# Patient Record
Sex: FEMALE | Race: WHITE | HISPANIC OR LATINO
[De-identification: names, ages, dates, MRNs, and addresses within clinical notes are randomized per-mention and may not be internally consistent; named-entity substitution may affect disease eponyms.]

---

## 2020-04-27 ENCOUNTER — TRANSCRIPTION ENCOUNTER (OUTPATIENT)
Age: 60
End: 2020-04-27

## 2021-11-16 ENCOUNTER — TRANSCRIPTION ENCOUNTER (OUTPATIENT)
Age: 61
End: 2021-11-16

## 2022-03-08 PROBLEM — Z00.00 ENCOUNTER FOR PREVENTIVE HEALTH EXAMINATION: Status: ACTIVE | Noted: 2022-03-08

## 2022-04-06 ENCOUNTER — APPOINTMENT (OUTPATIENT)
Dept: ORTHOPEDIC SURGERY | Facility: CLINIC | Age: 62
End: 2022-04-06
Payer: COMMERCIAL

## 2022-04-06 VITALS — BODY MASS INDEX: 37.69 KG/M2 | WEIGHT: 192 LBS | HEIGHT: 60 IN

## 2022-04-06 VITALS — TEMPERATURE: 98 F

## 2022-04-06 PROCEDURE — 99204 OFFICE O/P NEW MOD 45 MIN: CPT

## 2022-04-06 PROCEDURE — 73564 X-RAY EXAM KNEE 4 OR MORE: CPT | Mod: 50

## 2022-04-07 RX ORDER — DICLOFENAC SODIUM 50 MG/1
50 TABLET, DELAYED RELEASE ORAL
Qty: 60 | Refills: 1 | Status: ACTIVE | COMMUNITY
Start: 2022-04-07 | End: 1900-01-01

## 2022-04-07 NOTE — DISCUSSION/SUMMARY
[de-identified] : bl knee OA\par \par The patient and I discussed the causes and progression of degenerative joint disease of the knee. Models, diagrams and drawings were used in the discussion. Treatment can include conservative non-operative management and surgical options. Conservative management includes weight loss, activity modification, physical therapy to improve motion and strength in the muscles around the knee and the body's core, PO and topical NSAIDs, corticosteroid and/or viscosupplementation intra-articular injections. If the patient fails to improve with non-operative management, surgical management is possible. Depending upon the patient's age, BMI, activity level, degree and location of arthrosis different surgical options are possible including arthroscopic debridement with chondroplasty, high-tibial osteotomy, unicondylar/partial arthroplasty, and total joint arthroplasty.\par \par Physical therapy was prescribed for knee ROM exercises, strengthening exercises, deep tissue massage, core strengthening, hip abductor strengthening, VMO strengthening, modalities PRN, and home exercises\par \par The patient was prescribed Diclofenac PO non-steroidal anti-inflammatory medication. 50mg tablets twice daily to be taken for at least 1-2 weeks in a row and then PRN afterwards. Risks and benefits were discussed and include but not limited to renal damage and GI ulceration and bleeding.  They were advised to take with food to limit stomach upset as well as warned to stop the medication if worsening gastric pain or dizziness or other side effects. Also to immediately stop the medication and seek appropriate medical attention if any severe stomach ache, gastritis, black/red vomit, black/red stools or any other medical concern.\par \par Patient declined corticosteroid injection\par \par The patient verifies their understanding the the visit, diagnosis and plan. They agree with the treatment plan and will contact the office with any questions or problems.\par Follow up\par PRN

## 2022-04-07 NOTE — PHYSICAL EXAM
[de-identified] : Physical Examination General: well nourished, in no acute distress, alert and oriented to person, place and time Psychiatric: normal mood and affect, no abnormal movements or speech patterns Eyes: vision intact WITHOUT glasses  Musculoskeletal Examination Ambulation	NO antalgic gait, NO assistive devices  \par Knee			Right			Left \par General     \par Swelling/Deformity	normal			normal	      \par Skin			normal			normal      \par Erythema		-			-      \par Standing Alignment	varus			varus      \par Effusion		none			none\par  Range of Motion      \par Hip			full painless ROM		full painless ROM      \par Knee Flexion		100			100      \par Knee Extension	0			0 \par Patella      \par J Sign		-			-     \par  Quad Medial/Lateral	1/1 1/1      \par Apprehension		-			-      \par Alexis's		-			-      \par Grind Sign		-			-      \par Crepitus		-			- \par Palpation      \par Medial Joint Line	-			-      \par Medial Fem Condyle	-			-      \par Lateral Joint Line	-			-      \par Quad Tendon		-			-      \par Patella Tendon	-			-      \par Medial Patella		-			-      \par Lateral Patella 	-			-      \par Posterior Knee	-			-\par \par  Ligamentous      \par Varus @ 0° / 30°	-/-			-/-      \par Valgus @ 0° / 30°	-/-			-/-      \par Meniscus      \par Nikko		-			-      \par Flexion Pinch		-			- \par Strength Examination/Atrophy      \par Hip Flexors 		5+			5+      \par Quadriceps		5+			5+      \par Hamstring		5+			5+      \par Tibialis Anterior	5+			5+      \par Achilles/Soleus	5+			5+\par \par  Sensation      Deep Peroneal	normal			normal      Superficial Peroneal 	normal			normal      Sural  		normal			normal      Posterior Tibial 	normal			normal      Saphneous 		normal			normal Pulses      DP			2+			2+  [de-identified] : 4 views of the affected [BL] knee (standing AP, flexing standing AP, 30degree flexed lateral, sunrise view)\par were ordered, obtained and evaluated by myself today and\par demonstrate:\par \par RIGHT\par severe medial narrowing\par small osteophytic lipping\par trace suprapatellar effusion\par midl patellofemoral joint space loss without evidence of tilt or subluxation on sunrise view\par Normal soft tissue density\par Otherwise normal osseous bone structure without fracture or dislocation\par \par LEFT\par severe medial narrowing\par small osteophytic lipping\par trace suprapatellar effusion\par midl patellofemoral joint space loss without evidence of tilt or subluxation on sunrise view\par Normal soft tissue density\par Otherwise normal osseous bone structure without fracture or dislocation\par

## 2022-04-18 ENCOUNTER — NON-APPOINTMENT (OUTPATIENT)
Age: 62
End: 2022-04-18

## 2023-03-08 ENCOUNTER — APPOINTMENT (OUTPATIENT)
Dept: ORTHOPEDIC SURGERY | Facility: CLINIC | Age: 63
End: 2023-03-08
Payer: COMMERCIAL

## 2023-03-08 PROCEDURE — 99204 OFFICE O/P NEW MOD 45 MIN: CPT

## 2023-03-08 PROCEDURE — 73564 X-RAY EXAM KNEE 4 OR MORE: CPT | Mod: 50

## 2023-03-08 RX ORDER — HYALURONATE SODIUM 20 MG/2 ML
20 SYRINGE (ML) INTRAARTICULAR
Qty: 6 | Refills: 0 | Status: ACTIVE | COMMUNITY
Start: 2023-03-08

## 2023-03-09 RX ORDER — HYALURONATE SODIUM 20 MG/2 ML
20 SYRINGE (ML) INTRAARTICULAR
Qty: 6 | Refills: 0 | Status: ACTIVE | COMMUNITY
Start: 2023-03-09 | End: 1900-01-01

## 2023-03-10 DIAGNOSIS — M17.0 BILATERAL PRIMARY OSTEOARTHRITIS OF KNEE: ICD-10-CM

## 2023-03-10 RX ORDER — DICLOFENAC SODIUM 75 MG/1
75 TABLET, DELAYED RELEASE ORAL TWICE DAILY
Qty: 30 | Refills: 0 | Status: ACTIVE | COMMUNITY
Start: 2023-03-10 | End: 1900-01-01

## 2023-03-17 NOTE — DISCUSSION/SUMMARY
[de-identified] : The natural progression of osteoarthritis was explained to the patient.  We discussed the possible treatment options from conservative to operative.  These included NSAIDS, Glucosamine and Chondrotin sulfate, and Physical Therapy as well different types of injections.  We also discussed that at some point they may progress to needed a TKA.  Information and pamphlets were given.\par \par We discussed their diagnosis and treatment options at length including surgical and non-surgical options. We will first attempt conservative treatment with activity modification, PT, icing, weight loss, and anti-inflammatory medications. We discussed the possible of injections (steroid and viscosupplementation) in the future. The patient was provided with a PT prescription to work on ROM, hip ER/abductors strengthening, quad/hamstring stretches and strengthening, and other exercises on the Knee Arthritis Protocol.\par \par Dx / Natural History:\par The patient was advised of the diagnosis.  The natural history of the pathology was explained in full to the patient in layman's terms.  Several different treatment options were discussed and explained in full to the patient including the risks and benefits of both surgical and non-surgical treatments.  All questions and concerns were answered. \par \par Pain Guide Activities:\par The patient was advised to let pain guide the gradual advancement of activities.\par \par Physical Therapy:\par The patient was provided with a prescription for Physical Therapy.\par \par Icing:\par The patient was advised to apply ice (wrapped in a towel or protective covering) to the area daily (20 minutes at a time, 2-4X/day).\par \par Follow up in 4-6 weeks to re-evaluate.

## 2023-03-17 NOTE — PHYSICAL EXAM
[de-identified] : The patient is a well appearing 62 year old female of their stated age.\par Patient ambulates with a ANTALGIC gait.\par Negative straight leg raise bilateral\par \par General: in no acute distress, seated comfortably, moving easily\par Skin: No discoloration, rashes; on palpation skin is dry, \par Neuro: Normal sensation all dermatomes, motor all myotomes\par Vascular: Normal pulses, no edema, normal temperature\par Coordination and balance: Normal\par Psych: normal mood and affect, non pressured speech, alert and oriented x3\par \par Right Knee:                         	\par ROM:  0-145 degrees\par \par Lachman: Negative\par Pivot Shift: Negative\par Anterior Drawer: Negative\par Posterior Drawer / Sag: Negative\par Varus Stress 0 degrees: Stable\par Varus Stress 30 degrees: Stable\par Valgus Stress 0 degrees: Stable\par Valgus Stress 30 degrees: Stable\par Medial Rogelio: Positive\par Lateral Rogelio: Negative\par Patella Glide: 2+\par Patella Apprehension: Negative\par Patella Grind: Positive\par \par Palpation:\par Medial Joint Line: TTP\par Lateral Joint Line: TTP\par Medial Collateral Ligament: Nontender\par Lateral Collateral Ligament/PLC: Nontender\par Distal Femur: Nontender\par Proximal Tibia: Nontender\par Tibial Tubercle: Nontender\par Distal Pole Patella: Nontender\par Quadriceps Tendon: Nontender &  Intact\par Patella Tendon: Nontender &  Intact\par Medial Distal Hamstring/PES: Nontender\par Lateral Distal Hamstring: Nontender & Stable\par Iliotibial Band: Nontender\par Medial Patellofemoral Ligament: Nontender\par Adductor: Nontender\par Proximal GSC-Plantaris: Nontender\par Calf: Supple & Nontender\par \par Inspection:\par Deformity: No\par Erythema: No\par Ecchymosis: No\par Abrasions: No\par Effusion: Moderate\par Prepatellar Bursitis: No\par \par Neurologic Exam:\par Sensation L4-S1: Grossly Intact\par \par Motor Exam:\par Quadriceps: 5 out of 5\par Hamstrings: 5 out of 5\par EHL: 5 out of 5\par FHL: 5 out of 5\par TA: 5 out of 5\par GS: 5 out of 5\par \par Circulatory/Pulses:\par Dorsalis Pedis: 2+\par Posterior Tibialis: 2+\par \par Additional Pertinent Findings: None\par \par Left Knee:                           	\par ROM:  0-145 degrees\par \par Lachman: Negative\par Pivot Shift: Negative\par Anterior Drawer: Negative\par Posterior Drawer / Sag: Negative\par Varus Stress 0 degrees: Stable\par Varus Stress 30 degrees: Stable\par Valgus Stress 0 degrees: Stable\par Valgus Stress 30 degrees: Stable\par Medial Rogelio: Positive\par Lateral Rogelio: Negative\par Patella Glide: 2+\par Patella Apprehension: Negative\par Patella Grind: Positive\par \par Palpation:\par Medial Joint Line: TTP\par Lateral Joint Line: TTP\par Medial Collateral Ligament: Nontender\par Lateral Collateral Ligament/PLC: Nontender\par Distal Femur: Nontender\par Proximal Tibia: Nontender\par Tibial Tubercle: Nontender\par Distal Pole Patella: Nontender\par Quadriceps Tendon: Nontender &  Intact\par Patella Tendon: Nontender &  Intact\par Medial Distal Hamstring/PES: Nontender\par Lateral Distal Hamstring: Nontender & Stable\par Iliotibial Band: Nontender\par Medial Patellofemoral Ligament: Nontender\par Adductor: Nontender\par Proximal GSC-Plantaris: Nontender\par Calf: Supple & Nontender\par \par Inspection:\par Deformity: No\par Erythema: No\par Ecchymosis: No\par Abrasions: No\par Effusion: Moderate\par Prepatellar Bursitis: No\par \par Neurologic Exam:\par Sensation L4-S1: Grossly Intact\par \par Motor Exam:\par Quadriceps: 5 out of 5\par Hamstrings: 5 out of 5\par EHL: 5 out of 5\par FHL: 5 out of 5\par TA: 5 out of 5\par GS: 5 out of 5\par \par Circulatory/Pulses:\par Dorsalis Pedis: 2+\par Posterior Tibialis: 2+  [Bilateral] : knee bilaterally [There are no fractures, subluxations or dislocations. No significant abnormalities are seen] : There are no fractures, subluxations or dislocations. No significant abnormalities are seen [Moderate tricompartmental OA medial narrowing] : Moderate tricompartmental OA medial narrowing

## 2023-03-17 NOTE — HISTORY OF PRESENT ILLNESS
[7] : 7 [5] : 5 [Dull/Aching] : dull/aching [Intermittent] : intermittent [Sleep] : sleep [Sitting] : sitting [] : no [FreeTextEntry5] : RAJENDRA 62 year old F here for Bilateral knee, onset pain for a few years, last saw  , pt was dx with OA \par  [de-identified] :   [de-identified] : x -ray

## 2024-02-26 ENCOUNTER — APPOINTMENT (OUTPATIENT)
Dept: NEUROLOGY | Facility: CLINIC | Age: 64
End: 2024-02-26

## 2024-04-29 ENCOUNTER — APPOINTMENT (OUTPATIENT)
Dept: ORTHOPEDIC SURGERY | Facility: CLINIC | Age: 64
End: 2024-04-29
Payer: COMMERCIAL

## 2024-04-29 VITALS — HEIGHT: 60 IN | WEIGHT: 189 LBS | BODY MASS INDEX: 37.11 KG/M2

## 2024-04-29 DIAGNOSIS — M17.11 UNILATERAL PRIMARY OSTEOARTHRITIS, RIGHT KNEE: ICD-10-CM

## 2024-04-29 DIAGNOSIS — M17.12 UNILATERAL PRIMARY OSTEOARTHRITIS, LEFT KNEE: ICD-10-CM

## 2024-04-29 PROCEDURE — 73564 X-RAY EXAM KNEE 4 OR MORE: CPT | Mod: 50

## 2024-04-29 PROCEDURE — 99214 OFFICE O/P EST MOD 30 MIN: CPT | Mod: 25

## 2024-04-29 PROCEDURE — 20610 DRAIN/INJ JOINT/BURSA W/O US: CPT | Mod: 50

## 2024-04-29 RX ORDER — OMEPRAZOLE 40 MG/1
40 CAPSULE, DELAYED RELEASE ORAL
Refills: 0 | Status: ACTIVE | COMMUNITY

## 2024-04-29 RX ORDER — ATORVASTATIN CALCIUM 40 MG/1
40 TABLET, FILM COATED ORAL
Refills: 0 | Status: ACTIVE | COMMUNITY

## 2024-04-29 RX ORDER — FERROUS SULFATE 325(65) MG
324 TABLET ORAL
Refills: 0 | Status: ACTIVE | COMMUNITY

## 2024-04-29 RX ORDER — FLUTICASONE PROPIONATE 220 UG/1
AEROSOL, METERED RESPIRATORY (INHALATION)
Refills: 0 | Status: ACTIVE | COMMUNITY

## 2024-04-29 NOTE — PROCEDURE
[FreeTextEntry3] : The risks, benefits and alternatives to the injection were discussed with the patient. The decision was made to proceed with the injection to reduce inflammation within the area. Verbal consent was obtained for the procedure. The bilateral knee was cleaned with alcohol and ethyl chloride was sprayed topically. 1cc of 40mg Kenalog and 4cc of 1% lidocaine was injected. The patient tolerated the procedure well and was instructed to ice the affected joint as needed later today. Activities can be performed as tolerated

## 2024-04-29 NOTE — HISTORY OF PRESENT ILLNESS
[7] : 7 [4] : 4 [Dull/Aching] : dull/aching [Localized] : localized [Constant] : constant [Meds] : meds [Sitting] : sitting [Standing] : standing [Walking] : walking [Bending forward] : bending forward [Stairs] : stairs [Lying in bed] : lying in bed [de-identified] : 04/29/2024: Ms. RAJENDRA RAMIREZ is a 63 year female that comes in today with a chief complaint of bilateral knee pain. Pt denies any prior injury and states that her knee pain has worsened over time. She has seen Dr. Sweeney in the past but she didn't receive any injections and didn't go to PT. Pt has been taking Tylenol and Diclofenac as needed for relief. She states that she feels pain with any activity and at rest.  [] : no [FreeTextEntry1] : bilateral knee  [FreeTextEntry6] : stiffness

## 2024-04-29 NOTE — ASSESSMENT
[FreeTextEntry1] : 63 year F with bilateral knee OA, no prior injections CSI of bilateral knees. 3 months follow up PRN

## 2024-04-29 NOTE — IMAGING
[de-identified] : Constitutional: well developed and well nourished, able to communicate Cardiovascular: Peripheral vascular exam is grossly normal Neurologic: Alert and oriented, no acute distress. Skin: normal skin with no ulcers, rashes, or lesions Pulmonary: No respiratory distress, breathing comfortably on room air Lymphatics: No obvious lymphadenopathy or lymphedema in areas examined  RIGHT KNEE EXAM Alignment: varus Effusion: None Atrophy: None                                                  Stable to Varus/valgus stress Posterior Drawer Test: negative Anterior Drawer Test: Negative Knee Extension/Flexion: 0 / 120  Medial/lateral compartments Medial joint line: POS Tenderness Lateral joint line: No Tenderness Rogelio test: negative  Patellofemoral joint Medial patellar facet: no tenderness Patellar grind: Negative  Tendons: Pes Anserine: No tenderness Gerdys Tubercle/ IT Band: No tenderness Quadriceps Tendon: No Tenderness patellar tendon: no Tenderness Tibial tubercle: not tenderness Calf: no Tenderness  Neurovascular exam Muscle strength: 5/5 Sensation to light touch: intact Distal pulses: 2+  [id number] Xrays of the Right Knee were taken demonstrating tricompartmental arthritis with joint space collapse, osteophytes, and sclerosis

## 2024-07-29 ENCOUNTER — APPOINTMENT (OUTPATIENT)
Dept: ORTHOPEDIC SURGERY | Facility: CLINIC | Age: 64
End: 2024-07-29
Payer: COMMERCIAL

## 2024-07-29 DIAGNOSIS — M17.12 UNILATERAL PRIMARY OSTEOARTHRITIS, LEFT KNEE: ICD-10-CM

## 2024-07-29 DIAGNOSIS — M75.22 BICIPITAL TENDINITIS, LEFT SHOULDER: ICD-10-CM

## 2024-07-29 DIAGNOSIS — M17.11 UNILATERAL PRIMARY OSTEOARTHRITIS, RIGHT KNEE: ICD-10-CM

## 2024-07-29 PROCEDURE — 99214 OFFICE O/P EST MOD 30 MIN: CPT | Mod: 25

## 2024-07-29 PROCEDURE — 20610 DRAIN/INJ JOINT/BURSA W/O US: CPT | Mod: 50

## 2024-07-29 NOTE — HISTORY OF PRESENT ILLNESS
[de-identified] : 04/29/2024: Ms. RAJENDRA RAMIREZ is a 63 year female that comes in today with a chief complaint of bilateral knee pain. Pt denies any prior injury and states that her knee pain has worsened over time. She has seen Dr. Sweeney in the past but she didn't receive any injections and didn't go to PT. Pt has been taking Tylenol and Diclofenac as needed for relief. She states that she feels pain with any activity and at rest.   07/29/2024: RAJENDRA is here today for BILATERAL knee follow up. states CSI gave good relief, requesting repeat today.  left shoulder pain after a fall. had prior MRI of the left shoulder many years ago

## 2024-07-29 NOTE — IMAGING
[de-identified] : Constitutional: well developed and well nourished, able to communicate Cardiovascular: Peripheral vascular exam is grossly normal Neurologic: Alert and oriented, no acute distress. Skin: normal skin with no ulcers, rashes, or lesions Pulmonary: No respiratory distress, breathing comfortably on room air Lymphatics: No obvious lymphadenopathy or lymphedema in areas examined  RIGHT KNEE EXAM Alignment: varus Effusion: None Atrophy: None                                                  Stable to Varus/valgus stress Posterior Drawer Test: negative Anterior Drawer Test: Negative Knee Extension/Flexion: 0 / 120  Medial/lateral compartments Medial joint line: POS Tenderness Lateral joint line: No Tenderness Rogelio test: negative  Patellofemoral joint Medial patellar facet: no tenderness Patellar grind: Negative  Tendons: Pes Anserine: No tenderness Gerdys Tubercle/ IT Band: No tenderness Quadriceps Tendon: No Tenderness patellar tendon: no Tenderness Tibial tubercle: not tenderness Calf: no Tenderness  Neurovascular exam Muscle strength: 5/5 Sensation to light touch: intact Distal pulses: 2+  Left shoulder TTP biceps sheath  full ROM  [id number] Xrays of the Right Knee were taken demonstrating tricompartmental arthritis with joint space collapse, osteophytes, and sclerosis

## 2024-07-29 NOTE — ASSESSMENT
[FreeTextEntry1] : 63 year F with bilateral knee OA, no prior injections CSI of bilateral knees. 3 months follow up PRN  2024  CSI of bilateral knees today Left shoulder PT, fu with sports if no relief. PRN tylenol  for shoulder   Time Based billin minutes was spent with the patient today taking the patient's history, conducting a physical examination, reviewing imaging studies, and  detailed discussion regarding the diagnosis and treatment plan.

## 2024-10-28 ENCOUNTER — APPOINTMENT (OUTPATIENT)
Dept: ORTHOPEDIC SURGERY | Facility: CLINIC | Age: 64
End: 2024-10-28
Payer: MEDICARE

## 2024-10-28 DIAGNOSIS — M17.11 UNILATERAL PRIMARY OSTEOARTHRITIS, RIGHT KNEE: ICD-10-CM

## 2024-10-28 PROCEDURE — 20610 DRAIN/INJ JOINT/BURSA W/O US: CPT | Mod: 50

## 2025-01-16 ENCOUNTER — APPOINTMENT (OUTPATIENT)
Age: 65
End: 2025-01-16

## 2025-01-27 ENCOUNTER — APPOINTMENT (OUTPATIENT)
Dept: ORTHOPEDIC SURGERY | Facility: CLINIC | Age: 65
End: 2025-01-27
Payer: MEDICARE

## 2025-01-27 DIAGNOSIS — M17.0 BILATERAL PRIMARY OSTEOARTHRITIS OF KNEE: ICD-10-CM

## 2025-01-27 PROCEDURE — 20610 DRAIN/INJ JOINT/BURSA W/O US: CPT | Mod: 50

## 2025-01-27 PROCEDURE — 99213 OFFICE O/P EST LOW 20 MIN: CPT | Mod: 25

## 2025-04-26 ENCOUNTER — NON-APPOINTMENT (OUTPATIENT)
Age: 65
End: 2025-04-26

## 2025-04-28 ENCOUNTER — APPOINTMENT (OUTPATIENT)
Dept: ORTHOPEDIC SURGERY | Facility: CLINIC | Age: 65
End: 2025-04-28
Payer: MEDICARE

## 2025-04-28 DIAGNOSIS — M17.0 BILATERAL PRIMARY OSTEOARTHRITIS OF KNEE: ICD-10-CM

## 2025-04-28 PROCEDURE — 20610 DRAIN/INJ JOINT/BURSA W/O US: CPT | Mod: 50

## 2025-04-28 PROCEDURE — 99213 OFFICE O/P EST LOW 20 MIN: CPT | Mod: 25

## 2025-06-02 ENCOUNTER — APPOINTMENT (OUTPATIENT)
Dept: ORTHOPEDIC SURGERY | Facility: CLINIC | Age: 65
End: 2025-06-02
Payer: MEDICARE

## 2025-06-02 PROCEDURE — 99212 OFFICE O/P EST SF 10 MIN: CPT | Mod: 25

## 2025-06-02 PROCEDURE — 20610 DRAIN/INJ JOINT/BURSA W/O US: CPT | Mod: 50

## 2025-06-09 ENCOUNTER — APPOINTMENT (OUTPATIENT)
Dept: ORTHOPEDIC SURGERY | Facility: CLINIC | Age: 65
End: 2025-06-09
Payer: MEDICARE

## 2025-06-09 PROCEDURE — 20610 DRAIN/INJ JOINT/BURSA W/O US: CPT | Mod: 50

## 2025-06-09 PROCEDURE — 99212 OFFICE O/P EST SF 10 MIN: CPT | Mod: 25

## 2025-06-16 ENCOUNTER — APPOINTMENT (OUTPATIENT)
Dept: ORTHOPEDIC SURGERY | Facility: CLINIC | Age: 65
End: 2025-06-16
Payer: MEDICARE

## 2025-06-16 PROCEDURE — 99212 OFFICE O/P EST SF 10 MIN: CPT | Mod: 25

## 2025-06-16 PROCEDURE — 20610 DRAIN/INJ JOINT/BURSA W/O US: CPT | Mod: 50
